# Patient Record
Sex: FEMALE | Race: WHITE | NOT HISPANIC OR LATINO | URBAN - METROPOLITAN AREA
[De-identification: names, ages, dates, MRNs, and addresses within clinical notes are randomized per-mention and may not be internally consistent; named-entity substitution may affect disease eponyms.]

---

## 2021-06-01 ENCOUNTER — OFFICE VISIT (OUTPATIENT)
Dept: URBAN - METROPOLITAN AREA TELEHEALTH 2 | Facility: TELEHEALTH | Age: 41
End: 2021-06-01
Payer: MEDICARE

## 2021-06-01 DIAGNOSIS — N80.9 ENDOMETRIOSIS: ICD-10-CM

## 2021-06-01 DIAGNOSIS — K50.80 CROHN'S DISEASE OF BOTH SMALL AND LARGE INTESTINE WITHOUT COMPLICATION: ICD-10-CM

## 2021-06-01 DIAGNOSIS — N80.8 OTHER ENDOMETRIOSIS: ICD-10-CM

## 2021-06-01 DIAGNOSIS — L98.8 FISTULA: ICD-10-CM

## 2021-06-01 PROBLEM — 71833008: Status: ACTIVE | Noted: 2021-06-01

## 2021-06-01 PROBLEM — 129103003: Status: ACTIVE | Noted: 2021-06-01

## 2021-06-01 PROCEDURE — 99214 OFFICE O/P EST MOD 30 MIN: CPT | Performed by: INTERNAL MEDICINE

## 2021-06-01 RX ORDER — ERGOCALCIFEROL 1.25 MG/1
CAPSULE ORAL
Qty: 0 | Refills: 0 | Status: DISCONTINUED | COMMUNITY
Start: 1900-01-01

## 2021-06-01 NOTE — HPI-TODAY'S VISIT:
40 y.o. 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 Barfield did it - really helped when was in it - stays irritated w/o drain Not having flare ups all the time Having them more frequent though Flare ups make her fistula worse Not even sure if needs Crohn's medicine D/c w/ steroids and 6MP, which was on in past, as well as bp meds - haven't started them yet Cimzia and Humira have done great - been several years

## 2021-06-14 ENCOUNTER — WEB ENCOUNTER (OUTPATIENT)
Dept: URBAN - METROPOLITAN AREA CLINIC 92 | Facility: CLINIC | Age: 41
End: 2021-06-14

## 2021-06-14 RX ORDER — PREDNISONE 10 MG/1
TAPER TABLET ORAL
Qty: 60 | Refills: 0 | OUTPATIENT

## 2021-07-04 ENCOUNTER — OUT OF OFFICE VISIT (OUTPATIENT)
Dept: URBAN - METROPOLITAN AREA MEDICAL CENTER 28 | Facility: MEDICAL CENTER | Age: 41
End: 2021-07-04
Payer: MEDICARE

## 2021-07-04 DIAGNOSIS — R74.01 ALT (SGPT) LEVEL RAISED: ICD-10-CM

## 2021-07-04 DIAGNOSIS — R74.8 ABNORMAL ALKALINE PHOSPHATASE TEST: ICD-10-CM

## 2021-07-04 DIAGNOSIS — D64.89 ANEMIA DUE TO OTHER CAUSE: ICD-10-CM

## 2021-07-04 DIAGNOSIS — K50.90 ABDOMINAL PAIN DESPITE THERAPY FOR CROHN'S DISEASE: ICD-10-CM

## 2021-07-04 PROCEDURE — 99232 SBSQ HOSP IP/OBS MODERATE 35: CPT | Performed by: INTERNAL MEDICINE

## 2021-07-04 PROCEDURE — 99222 1ST HOSP IP/OBS MODERATE 55: CPT | Performed by: INTERNAL MEDICINE

## 2021-07-04 PROCEDURE — G8427 DOCREV CUR MEDS BY ELIG CLIN: HCPCS | Performed by: INTERNAL MEDICINE

## 2021-07-06 ENCOUNTER — OUT OF OFFICE VISIT (OUTPATIENT)
Dept: URBAN - METROPOLITAN AREA MEDICAL CENTER 28 | Facility: MEDICAL CENTER | Age: 41
End: 2021-07-06
Payer: MEDICARE

## 2021-07-06 DIAGNOSIS — R19.7 ACUTE DIARRHEA: ICD-10-CM

## 2021-07-06 DIAGNOSIS — R74.01 ALT (SGPT) LEVEL RAISED: ICD-10-CM

## 2021-07-06 DIAGNOSIS — K50.918 ACUTE CROHN'S DISEASE WITH OTHER COMPLICATION: ICD-10-CM

## 2021-07-06 PROCEDURE — 99232 SBSQ HOSP IP/OBS MODERATE 35: CPT | Performed by: INTERNAL MEDICINE

## 2021-07-08 ENCOUNTER — OUT OF OFFICE VISIT (OUTPATIENT)
Dept: URBAN - METROPOLITAN AREA MEDICAL CENTER 28 | Facility: MEDICAL CENTER | Age: 41
End: 2021-07-08
Payer: MEDICARE

## 2021-07-08 DIAGNOSIS — R10.84 ABDOMINAL CRAMPING, GENERALIZED: ICD-10-CM

## 2021-07-08 DIAGNOSIS — K50.918 ACUTE CROHN'S DISEASE WITH OTHER COMPLICATION: ICD-10-CM

## 2021-07-08 DIAGNOSIS — R74.8 ABNORMAL ALKALINE PHOSPHATASE TEST: ICD-10-CM

## 2021-07-08 DIAGNOSIS — D72.829 ELEVATED WBC COUNT: ICD-10-CM

## 2021-07-08 PROCEDURE — 99232 SBSQ HOSP IP/OBS MODERATE 35: CPT | Performed by: INTERNAL MEDICINE

## 2021-07-12 ENCOUNTER — OFFICE VISIT (OUTPATIENT)
Dept: URBAN - METROPOLITAN AREA CLINIC 96 | Facility: CLINIC | Age: 41
End: 2021-07-12
Payer: MEDICARE

## 2021-07-12 ENCOUNTER — WEB ENCOUNTER (OUTPATIENT)
Dept: URBAN - METROPOLITAN AREA CLINIC 96 | Facility: CLINIC | Age: 41
End: 2021-07-12

## 2021-07-12 VITALS
DIASTOLIC BLOOD PRESSURE: 95 MMHG | BODY MASS INDEX: 30.32 KG/M2 | HEART RATE: 108 BPM | TEMPERATURE: 98.1 F | WEIGHT: 182 LBS | SYSTOLIC BLOOD PRESSURE: 146 MMHG | HEIGHT: 65 IN

## 2021-07-12 DIAGNOSIS — R74.8 ABNORMAL LIVER ENZYMES: ICD-10-CM

## 2021-07-12 DIAGNOSIS — R11.2 INTRACTABLE VOMITING WITH NAUSEA, UNSPECIFIED VOMITING TYPE: ICD-10-CM

## 2021-07-12 DIAGNOSIS — R10.9 ABDOMINAL CRAMPING: ICD-10-CM

## 2021-07-12 DIAGNOSIS — K50.813 CROHN'S DISEASE OF BOTH SMALL AND LARGE INTESTINE WITH FISTULA: ICD-10-CM

## 2021-07-12 PROCEDURE — 99214 OFFICE O/P EST MOD 30 MIN: CPT | Performed by: INTERNAL MEDICINE

## 2021-07-12 RX ORDER — PREDNISONE 10 MG/1
TAPER TABLET ORAL
Qty: 60 | Refills: 0 | Status: ACTIVE | COMMUNITY

## 2021-07-12 NOTE — HPI-TODAY'S VISIT:
41 y.o. female Rough couple months IV steroids help, but, once starts to taper steroids, flares again Been doing well for a long time off all medicine Steroids just make every other syx worse - hate the way make her feel In and out of hospital at home and here last week Deals with the diarrhea, but the cramping is unbearable leading to N/V When stool goes through fistula, it is a real issue

## 2021-07-19 ENCOUNTER — LAB OUTSIDE AN ENCOUNTER (OUTPATIENT)
Dept: URBAN - METROPOLITAN AREA CLINIC 96 | Facility: CLINIC | Age: 41
End: 2021-07-19

## 2021-07-26 ENCOUNTER — LAB OUTSIDE AN ENCOUNTER (OUTPATIENT)
Dept: URBAN - METROPOLITAN AREA CLINIC 96 | Facility: CLINIC | Age: 41
End: 2021-07-26

## 2021-08-02 ENCOUNTER — LAB OUTSIDE AN ENCOUNTER (OUTPATIENT)
Dept: URBAN - METROPOLITAN AREA CLINIC 96 | Facility: CLINIC | Age: 41
End: 2021-08-02

## 2021-08-06 ENCOUNTER — TELEPHONE ENCOUNTER (OUTPATIENT)
Dept: URBAN - METROPOLITAN AREA CLINIC 96 | Facility: CLINIC | Age: 41
End: 2021-08-06

## 2021-08-07 ENCOUNTER — OUT OF OFFICE VISIT (OUTPATIENT)
Dept: URBAN - METROPOLITAN AREA MEDICAL CENTER 28 | Facility: MEDICAL CENTER | Age: 41
End: 2021-08-07
Payer: MEDICARE

## 2021-08-07 DIAGNOSIS — R19.7 ACUTE DIARRHEA: ICD-10-CM

## 2021-08-07 DIAGNOSIS — R74.01 ABNORMAL/ELEVATED TRANSAMINASE (SGOT, AMINOTRANSFERASE): ICD-10-CM

## 2021-08-07 DIAGNOSIS — K50.012 CROHN'S DISEASE OF DUODENUM WITH INTESTINAL OBSTRUCTION: ICD-10-CM

## 2021-08-07 DIAGNOSIS — D64.89 ANEMIA DUE TO OTHER CAUSE: ICD-10-CM

## 2021-08-07 DIAGNOSIS — K50.018 CROHN'S DISEASE OF DUODENUM WITH OTHER COMPLICATION: ICD-10-CM

## 2021-08-07 PROCEDURE — 99233 SBSQ HOSP IP/OBS HIGH 50: CPT | Performed by: INTERNAL MEDICINE

## 2021-08-07 PROCEDURE — 99223 1ST HOSP IP/OBS HIGH 75: CPT | Performed by: INTERNAL MEDICINE

## 2021-08-07 PROCEDURE — G8427 DOCREV CUR MEDS BY ELIG CLIN: HCPCS | Performed by: INTERNAL MEDICINE

## 2021-08-09 ENCOUNTER — OUT OF OFFICE VISIT (OUTPATIENT)
Dept: URBAN - METROPOLITAN AREA MEDICAL CENTER 28 | Facility: MEDICAL CENTER | Age: 41
End: 2021-08-09
Payer: MEDICARE

## 2021-08-09 DIAGNOSIS — R74.01 ABNORMAL/ELEVATED TRANSAMINASE (SGOT, AMINOTRANSFERASE): ICD-10-CM

## 2021-08-09 DIAGNOSIS — K50.912 ACUTE CROHN'S DISEASE WITH INTESTINAL OBSTRUCTION: ICD-10-CM

## 2021-08-09 PROCEDURE — 99232 SBSQ HOSP IP/OBS MODERATE 35: CPT | Performed by: INTERNAL MEDICINE

## 2021-08-10 ENCOUNTER — OUT OF OFFICE VISIT (OUTPATIENT)
Dept: URBAN - METROPOLITAN AREA MEDICAL CENTER 28 | Facility: MEDICAL CENTER | Age: 41
End: 2021-08-10
Payer: MEDICARE

## 2021-08-10 DIAGNOSIS — K50.012 CROHN'S DISEASE OF DUODENUM WITH INTESTINAL OBSTRUCTION: ICD-10-CM

## 2021-08-10 PROCEDURE — 45386 COLONOSCOPY W/BALLOON DILAT: CPT | Performed by: INTERNAL MEDICINE

## 2021-08-11 ENCOUNTER — OUT OF OFFICE VISIT (OUTPATIENT)
Dept: URBAN - METROPOLITAN AREA MEDICAL CENTER 28 | Facility: MEDICAL CENTER | Age: 41
End: 2021-08-11
Payer: MEDICARE

## 2021-08-11 DIAGNOSIS — R74.01 ABNORMAL/ELEVATED TRANSAMINASE (SGOT, AMINOTRANSFERASE): ICD-10-CM

## 2021-08-11 DIAGNOSIS — K50.912 ACUTE CROHN'S DISEASE WITH INTESTINAL OBSTRUCTION: ICD-10-CM

## 2021-08-11 PROCEDURE — 99232 SBSQ HOSP IP/OBS MODERATE 35: CPT | Performed by: INTERNAL MEDICINE

## 2021-08-12 PROBLEM — 71833008: Status: ACTIVE | Noted: 2021-07-12

## 2021-08-19 ENCOUNTER — WEB ENCOUNTER (OUTPATIENT)
Dept: URBAN - METROPOLITAN AREA CLINIC 92 | Facility: CLINIC | Age: 41
End: 2021-08-19

## 2021-08-19 ENCOUNTER — OFFICE VISIT (OUTPATIENT)
Dept: URBAN - METROPOLITAN AREA CLINIC 97 | Facility: CLINIC | Age: 41
End: 2021-08-19
Payer: MEDICARE

## 2021-08-19 VITALS
RESPIRATION RATE: 18 BRPM | BODY MASS INDEX: 30.49 KG/M2 | WEIGHT: 183 LBS | HEART RATE: 93 BPM | DIASTOLIC BLOOD PRESSURE: 95 MMHG | SYSTOLIC BLOOD PRESSURE: 111 MMHG | TEMPERATURE: 97.2 F | HEIGHT: 65 IN

## 2021-08-19 DIAGNOSIS — K50.80 CROHN'S COLITIS: ICD-10-CM

## 2021-08-19 PROBLEM — 56689002: Status: ACTIVE | Noted: 2021-08-19

## 2021-08-19 PROCEDURE — 96375 TX/PRO/DX INJ NEW DRUG ADDON: CPT | Performed by: INTERNAL MEDICINE

## 2021-08-19 PROCEDURE — 96365 THER/PROPH/DIAG IV INF INIT: CPT | Performed by: INTERNAL MEDICINE

## 2021-08-19 RX ORDER — ONDANSETRON HYDROCHLORIDE 4 MG/1
1 TABLET, FILM COATED ORAL ONCE
Qty: 1 | Refills: 0 | Status: ACTIVE | COMMUNITY
Start: 2021-08-19

## 2021-08-19 RX ORDER — HYDROCORTISONE SODIUM SUCCINATE 250 MG/2ML
AS DIRECTED INJECTION, POWDER, FOR SOLUTION INTRAMUSCULAR; INTRAVENOUS
Qty: 1 | Refills: 0 | OUTPATIENT
Start: 2021-08-19 | End: 2021-08-20

## 2021-08-19 RX ORDER — HYDROCORTISONE SODIUM SUCCINATE 250 MG/2ML
AS DIRECTED INJECTION, POWDER, FOR SOLUTION INTRAMUSCULAR; INTRAVENOUS
Qty: 1 | Refills: 0 | Status: ACTIVE | COMMUNITY
Start: 2021-08-19 | End: 2021-08-20

## 2021-08-19 RX ORDER — ONDANSETRON HYDROCHLORIDE 4 MG/1
1 TABLET, FILM COATED ORAL ONCE
Qty: 1 | Refills: 0 | OUTPATIENT
Start: 2021-08-19

## 2021-08-19 RX ORDER — PREDNISONE 10 MG/1
TAPER TABLET ORAL
Qty: 60 | Refills: 0 | Status: ACTIVE | COMMUNITY

## 2021-09-06 ENCOUNTER — OUT OF OFFICE VISIT (OUTPATIENT)
Dept: URBAN - METROPOLITAN AREA MEDICAL CENTER 28 | Facility: MEDICAL CENTER | Age: 41
End: 2021-09-06
Payer: MEDICARE

## 2021-09-06 DIAGNOSIS — R10.84 ABDOMINAL CRAMPING, GENERALIZED: ICD-10-CM

## 2021-09-06 DIAGNOSIS — R11.2 ACUTE NAUSEA WITH NONBILIOUS VOMITING: ICD-10-CM

## 2021-09-06 DIAGNOSIS — K50.90 ABDOMINAL PAIN DESPITE THERAPY FOR CROHN'S DISEASE: ICD-10-CM

## 2021-09-06 DIAGNOSIS — R93.3 ABN FINDINGS-GI TRACT: ICD-10-CM

## 2021-09-06 PROCEDURE — 99222 1ST HOSP IP/OBS MODERATE 55: CPT | Performed by: INTERNAL MEDICINE

## 2021-09-06 PROCEDURE — G8427 DOCREV CUR MEDS BY ELIG CLIN: HCPCS | Performed by: INTERNAL MEDICINE

## 2021-09-07 ENCOUNTER — OUT OF OFFICE VISIT (OUTPATIENT)
Dept: URBAN - METROPOLITAN AREA MEDICAL CENTER 28 | Facility: MEDICAL CENTER | Age: 41
End: 2021-09-07
Payer: MEDICARE

## 2021-09-07 DIAGNOSIS — R93.3 ABN FINDINGS-GI TRACT: ICD-10-CM

## 2021-09-07 DIAGNOSIS — R11.2 ACUTE NAUSEA WITH NONBILIOUS VOMITING: ICD-10-CM

## 2021-09-07 DIAGNOSIS — K50.90 ABDOMINAL PAIN DESPITE THERAPY FOR CROHN'S DISEASE: ICD-10-CM

## 2021-09-07 DIAGNOSIS — R10.84 ABDOMINAL CRAMPING, GENERALIZED: ICD-10-CM

## 2021-09-07 DIAGNOSIS — K50.913 ACUTE CROHN'S DISEASE WITH FISTULA: ICD-10-CM

## 2021-09-07 PROCEDURE — 99232 SBSQ HOSP IP/OBS MODERATE 35: CPT | Performed by: PHYSICIAN ASSISTANT

## 2021-09-07 PROCEDURE — 99232 SBSQ HOSP IP/OBS MODERATE 35: CPT | Performed by: INTERNAL MEDICINE

## 2021-09-09 ENCOUNTER — OUT OF OFFICE VISIT (OUTPATIENT)
Dept: URBAN - METROPOLITAN AREA MEDICAL CENTER 28 | Facility: MEDICAL CENTER | Age: 41
End: 2021-09-09
Payer: MEDICARE

## 2021-09-09 DIAGNOSIS — D72.829 ELEVATED WBC COUNT: ICD-10-CM

## 2021-09-09 DIAGNOSIS — D64.89 ANEMIA DUE TO OTHER CAUSE: ICD-10-CM

## 2021-09-09 DIAGNOSIS — K50.912 ACUTE CROHN'S DISEASE WITH INTESTINAL OBSTRUCTION: ICD-10-CM

## 2021-09-09 PROCEDURE — 99232 SBSQ HOSP IP/OBS MODERATE 35: CPT | Performed by: INTERNAL MEDICINE

## 2021-09-11 ENCOUNTER — OUT OF OFFICE VISIT (OUTPATIENT)
Dept: URBAN - METROPOLITAN AREA MEDICAL CENTER 28 | Facility: MEDICAL CENTER | Age: 41
End: 2021-09-11
Payer: MEDICARE

## 2021-09-11 DIAGNOSIS — E87.6 HYPOKALEMIA: ICD-10-CM

## 2021-09-11 DIAGNOSIS — D64.89 ANEMIA DUE TO OTHER CAUSE: ICD-10-CM

## 2021-09-11 DIAGNOSIS — R93.3 ABN FINDINGS-GI TRACT: ICD-10-CM

## 2021-09-11 DIAGNOSIS — K50.90 ABDOMINAL PAIN DESPITE THERAPY FOR CROHN'S DISEASE: ICD-10-CM

## 2021-09-11 PROCEDURE — 99232 SBSQ HOSP IP/OBS MODERATE 35: CPT | Performed by: INTERNAL MEDICINE

## 2021-09-13 ENCOUNTER — OUT OF OFFICE VISIT (OUTPATIENT)
Dept: URBAN - METROPOLITAN AREA MEDICAL CENTER 28 | Facility: MEDICAL CENTER | Age: 41
End: 2021-09-13
Payer: MEDICARE

## 2021-09-13 DIAGNOSIS — D72.829 ELEVATED WBC COUNT: ICD-10-CM

## 2021-09-13 DIAGNOSIS — K50.912 ACUTE CROHN'S DISEASE WITH INTESTINAL OBSTRUCTION: ICD-10-CM

## 2021-09-13 DIAGNOSIS — K50.913 ACUTE CROHN'S DISEASE WITH FISTULA: ICD-10-CM

## 2021-09-13 PROCEDURE — 99232 SBSQ HOSP IP/OBS MODERATE 35: CPT | Performed by: INTERNAL MEDICINE

## 2021-09-16 ENCOUNTER — OUT OF OFFICE VISIT (OUTPATIENT)
Dept: URBAN - METROPOLITAN AREA MEDICAL CENTER 28 | Facility: MEDICAL CENTER | Age: 41
End: 2021-09-16
Payer: MEDICARE

## 2021-09-16 DIAGNOSIS — K50.912 ACUTE CROHN'S DISEASE WITH INTESTINAL OBSTRUCTION: ICD-10-CM

## 2021-09-16 DIAGNOSIS — K50.913 ACUTE CROHN'S DISEASE WITH FISTULA: ICD-10-CM

## 2021-09-16 PROCEDURE — 99232 SBSQ HOSP IP/OBS MODERATE 35: CPT | Performed by: INTERNAL MEDICINE

## 2021-09-18 ENCOUNTER — OUT OF OFFICE VISIT (OUTPATIENT)
Dept: URBAN - METROPOLITAN AREA MEDICAL CENTER 28 | Facility: MEDICAL CENTER | Age: 41
End: 2021-09-18
Payer: MEDICARE

## 2021-09-18 DIAGNOSIS — K50.912 ACUTE CROHN'S DISEASE WITH INTESTINAL OBSTRUCTION: ICD-10-CM

## 2021-09-18 DIAGNOSIS — K50.913 ACUTE CROHN'S DISEASE WITH FISTULA: ICD-10-CM

## 2021-09-18 PROCEDURE — 99233 SBSQ HOSP IP/OBS HIGH 50: CPT | Performed by: INTERNAL MEDICINE

## 2021-09-22 ENCOUNTER — OUT OF OFFICE VISIT (OUTPATIENT)
Dept: URBAN - METROPOLITAN AREA MEDICAL CENTER 28 | Facility: MEDICAL CENTER | Age: 41
End: 2021-09-22
Payer: MEDICARE

## 2021-09-22 DIAGNOSIS — K50.912 ACUTE CROHN'S DISEASE WITH INTESTINAL OBSTRUCTION: ICD-10-CM

## 2021-09-22 DIAGNOSIS — K50.913 ACUTE CROHN'S DISEASE WITH FISTULA: ICD-10-CM

## 2021-09-22 PROCEDURE — 99232 SBSQ HOSP IP/OBS MODERATE 35: CPT | Performed by: INTERNAL MEDICINE

## 2021-10-04 ENCOUNTER — OFFICE VISIT (OUTPATIENT)
Dept: URBAN - METROPOLITAN AREA TELEHEALTH 2 | Facility: TELEHEALTH | Age: 41
End: 2021-10-04
Payer: MEDICARE

## 2021-10-04 ENCOUNTER — LAB OUTSIDE AN ENCOUNTER (OUTPATIENT)
Dept: URBAN - METROPOLITAN AREA CLINIC 96 | Facility: CLINIC | Age: 41
End: 2021-10-04

## 2021-10-04 ENCOUNTER — WEB ENCOUNTER (OUTPATIENT)
Dept: URBAN - METROPOLITAN AREA CLINIC 92 | Facility: CLINIC | Age: 41
End: 2021-10-04

## 2021-10-04 VITALS
BODY MASS INDEX: 29.32 KG/M2 | SYSTOLIC BLOOD PRESSURE: 96 MMHG | DIASTOLIC BLOOD PRESSURE: 67 MMHG | HEIGHT: 65 IN | WEIGHT: 176 LBS | TEMPERATURE: 98.2 F | HEART RATE: 106 BPM

## 2021-10-04 DIAGNOSIS — K50.812 CROHN'S DISEASE OF BOTH SMALL AND LARGE INTESTINE WITH INTESTINAL OBSTRUCTION: ICD-10-CM

## 2021-10-04 LAB
ABSOLUTE BASOPHIL COUNT: 0.04
ABSOLUTE EOSINOPHIL COUNT: 0.2
ABSOLUTE IMMATURE GRANULOCYTE  COUNT: 0.05
ABSOLUTE LYMPHOCYTE COUNT: 2.24
ABSOLUTE MONOCYTE COUNT: 0.96
ABSOLUTE NEUTROPHIL COUNT (ANC): 9.73
ABSOLUTE NRBC  COUNT: 0
AG RATIO: 1
ALBUMIN LEVEL: 3.4
ALK PHOS: 173
ALT: 28
ANION GAP: 11
AST: 19
BASOPHIL AUTO: 0
BILIRUBIN TOTAL: 0.3
BUN/CREAT RATIO: 14
BUN: 12
CALCIUM LEVEL: 9.2
CHLORIDE LEVEL: 109
CO2 LEVEL: 20
CREATININE LEVEL: 0.8
EOS AUTO: 2
FERRITIN LEVEL: 172.3
GFR AFRICAN AMERICAN: >60
GFR NON AFRICAN AMERICAN: >60
GLUCOSE LEVEL: 82
HCT: 31.4
HGB: 9.6
IMMATURE GRANULOCYTES AUTO: 0.4
IRON LEVEL: 19
IRON SAT: 7
LYMPH AUTO: 17
MAGNESIUM LEVEL: 1.8
MCH: 26.8
MCHC: 30.6
MCV: 87.7
MONO AUTO: 7
MPV: 10.7
NEUTRO AUTO: 74
NRBC AUTO: 0
OSMO (CALC): 278
PERFORMING LAB: (no result)
PLATELETS: 470
POTASSIUM LEVEL: 3.1
PROTEIN TOTAL: 6.8
RBC: 3.58
RDW: 14.2
SODIUM LEVEL: 140
TIBC: 262
WBC: 13.2

## 2021-10-04 PROCEDURE — 99214 OFFICE O/P EST MOD 30 MIN: CPT | Performed by: INTERNAL MEDICINE

## 2021-10-04 RX ORDER — POTASSIUM CHLORIDE 1.5 G/1.58G
1 PACKET WITH FOOD POWDER, FOR SOLUTION ORAL ONCE A DAY
Qty: 5 | Refills: 0 | OUTPATIENT

## 2021-10-04 RX ORDER — CHOLESTYRAMINE LIGHT 4 G/5.7G
1 PACKET POWDER, FOR SUSPENSION ORAL
Qty: 30 | Refills: 5 | OUTPATIENT

## 2021-10-04 RX ORDER — USTEKINUMAB 90 MG/ML
AS DIRECTED INJECTION, SOLUTION SUBCUTANEOUS
Qty: 1 PRE-FILLED PEN SYRINGE | Refills: 2 | OUTPATIENT

## 2021-10-04 NOTE — PHYSICAL EXAM GASTROINTESTINAL
Abdomen , soft, nontender, nondistended , no guarding or rigidity , no masses palpable , normal bowel sounds , Liver and Spleen , no hepatomegaly present , no hepatosplenomegaly , liver nontender , spleen not palpable  large vertical incision healing well

## 2021-10-04 NOTE — HPI-TODAY'S VISIT:
41 y.o WF 2 weeks ago had bowel resection - has appt w/ [facility] Jeffrtbelkis later today Feeling better than been Still weak - regular surgery recovery stuff Going to bathroom good Eating ok Stelara infusion 8/19/21 No blood in stool No joint pains

## 2021-10-15 ENCOUNTER — OFFICE VISIT (OUTPATIENT)
Dept: URBAN - METROPOLITAN AREA TELEHEALTH 2 | Facility: TELEHEALTH | Age: 41
End: 2021-10-15

## 2021-11-05 ENCOUNTER — TELEPHONE ENCOUNTER (OUTPATIENT)
Dept: URBAN - METROPOLITAN AREA CLINIC 92 | Facility: CLINIC | Age: 41
End: 2021-11-05

## 2021-11-12 ENCOUNTER — TELEPHONE ENCOUNTER (OUTPATIENT)
Dept: URBAN - METROPOLITAN AREA CLINIC 92 | Facility: CLINIC | Age: 41
End: 2021-11-12

## 2021-11-23 ENCOUNTER — TELEPHONE ENCOUNTER (OUTPATIENT)
Dept: URBAN - METROPOLITAN AREA CLINIC 96 | Facility: CLINIC | Age: 41
End: 2021-11-23

## 2021-11-24 ENCOUNTER — OFFICE VISIT (OUTPATIENT)
Dept: URBAN - METROPOLITAN AREA TELEHEALTH 2 | Facility: TELEHEALTH | Age: 41
End: 2021-11-24
Payer: MEDICARE

## 2021-11-24 ENCOUNTER — TELEPHONE ENCOUNTER (OUTPATIENT)
Dept: URBAN - METROPOLITAN AREA CLINIC 92 | Facility: CLINIC | Age: 41
End: 2021-11-24

## 2021-11-24 DIAGNOSIS — Z79.899 LONG-TERM USE OF IMMUNOSUPPRESSANT MEDICATION: ICD-10-CM

## 2021-11-24 DIAGNOSIS — K50.812 CROHN'S DISEASE OF BOTH SMALL AND LARGE INTESTINE WITH INTESTINAL OBSTRUCTION: ICD-10-CM

## 2021-11-24 DIAGNOSIS — R10.84 ABDOMINAL PAIN, GENERALIZED: ICD-10-CM

## 2021-11-24 PROCEDURE — 99215 OFFICE O/P EST HI 40 MIN: CPT | Performed by: INTERNAL MEDICINE

## 2021-11-24 RX ORDER — CHOLESTYRAMINE LIGHT 4 G/5.7G
1 PACKET POWDER, FOR SUSPENSION ORAL
Qty: 30 | Refills: 5 | Status: ACTIVE | COMMUNITY

## 2021-11-24 RX ORDER — USTEKINUMAB 90 MG/ML
AS DIRECTED INJECTION, SOLUTION SUBCUTANEOUS
Qty: 1 | Refills: 11 | OUTPATIENT
Start: 2021-11-24 | End: 2023-09-27

## 2021-11-24 RX ORDER — POTASSIUM CHLORIDE 1.5 G/1.58G
1 PACKET WITH FOOD POWDER, FOR SOLUTION ORAL ONCE A DAY
Qty: 5 | Refills: 0 | Status: ACTIVE | COMMUNITY

## 2021-11-24 RX ORDER — METHOTREXATE SODIUM 2.5 MG/1
5 TAB TABLET ORAL WEEKLY
Qty: 20 | Refills: 4 | OUTPATIENT
Start: 2021-11-24

## 2021-11-24 RX ORDER — USTEKINUMAB 90 MG/ML
AS DIRECTED INJECTION, SOLUTION SUBCUTANEOUS
Qty: 1 PRE-FILLED PEN SYRINGE | Refills: 2 | Status: ACTIVE | COMMUNITY

## 2021-11-24 RX ORDER — FOLIC ACID 1 MG/1
1 TABLET TABLET ORAL ONCE A DAY
Qty: 30 | Refills: 11 | OUTPATIENT
Start: 2021-11-24 | End: 2022-11-19

## 2021-11-24 NOTE — HPI-TODAY'S VISIT:
This is a 41-year-old individual with a history of Crohns disease, currently on stelara (started infusion 8/2021), here for follow up. . Pt is referred to us by Dr. Torres Vernon. . Patient was diagnosed with Crohns disease numerous years ago. She was last admitted in August 2021 with severe abdominal pain nausea and vomiting.  CT scan on August 2021 did revealed upstream dilation with low grade partial obstruction.  A balloon dilatation was performed at the time on August 10, 2021. The procedure was successful and biopsies taken revealed fibrosis. There is no active inflammation at the surgical anastomosis. However, she had complaints of further abdominal pain and underwent surgical resection in 9/2021. . Today on 11/24/2021, pt reports that she was doing very well until she developed N/V abd pain about 2 weeks ago. The pain was not severe as prior.  She had a CT scan performed at OSH, which showed partial obstruction.  She did not get c diff testing, but was given cipro/flagyl.  Has not had Stelara since .

## 2021-12-01 ENCOUNTER — LAB OUTSIDE AN ENCOUNTER (OUTPATIENT)
Dept: URBAN - METROPOLITAN AREA TELEHEALTH 2 | Facility: TELEHEALTH | Age: 41
End: 2021-12-01

## 2021-12-15 ENCOUNTER — TELEPHONE ENCOUNTER (OUTPATIENT)
Dept: URBAN - METROPOLITAN AREA CLINIC 92 | Facility: CLINIC | Age: 41
End: 2021-12-15

## 2021-12-16 ENCOUNTER — TELEPHONE ENCOUNTER (OUTPATIENT)
Dept: URBAN - METROPOLITAN AREA CLINIC 92 | Facility: CLINIC | Age: 41
End: 2021-12-16

## 2021-12-16 ENCOUNTER — OFFICE VISIT (OUTPATIENT)
Dept: URBAN - METROPOLITAN AREA TELEHEALTH 2 | Facility: TELEHEALTH | Age: 41
End: 2021-12-16
Payer: MEDICARE

## 2021-12-16 DIAGNOSIS — R10.9 ABDOMINAL CRAMPING: ICD-10-CM

## 2021-12-16 DIAGNOSIS — K50.812 CROHN'S DISEASE OF BOTH SMALL AND LARGE INTESTINE WITH INTESTINAL OBSTRUCTION: ICD-10-CM

## 2021-12-16 DIAGNOSIS — Z09 FOLLOW UP: ICD-10-CM

## 2021-12-16 DIAGNOSIS — Z79.899 LONG-TERM USE OF IMMUNOSUPPRESSANT MEDICATION: ICD-10-CM

## 2021-12-16 PROCEDURE — 99215 OFFICE O/P EST HI 40 MIN: CPT | Performed by: INTERNAL MEDICINE

## 2021-12-16 RX ORDER — USTEKINUMAB 90 MG/ML
AS DIRECTED INJECTION, SOLUTION SUBCUTANEOUS
Qty: 1 PRE-FILLED PEN SYRINGE | Refills: 2 | Status: ACTIVE | COMMUNITY

## 2021-12-16 RX ORDER — FOLIC ACID 1 MG/1
1 TABLET TABLET ORAL ONCE A DAY
Qty: 30 | Refills: 11 | Status: ACTIVE | COMMUNITY
Start: 2021-11-24 | End: 2022-11-19

## 2021-12-16 RX ORDER — FOLIC ACID 1 MG/1
1 TABLET TABLET ORAL ONCE A DAY
Qty: 30 | Refills: 11 | OUTPATIENT

## 2021-12-16 RX ORDER — USTEKINUMAB 90 MG/ML
AS DIRECTED INJECTION, SOLUTION SUBCUTANEOUS
Qty: 1 | Refills: 11 | Status: ACTIVE | COMMUNITY
Start: 2021-11-24 | End: 2023-09-27

## 2021-12-16 RX ORDER — PREDNISONE 10 MG/1
3 TABLET TABLET ORAL ONCE A DAY
Qty: 90 TABLET | Refills: 1 | OUTPATIENT
Start: 2021-12-16 | End: 2022-02-14

## 2021-12-16 RX ORDER — USTEKINUMAB 90 MG/ML
AS DIRECTED INJECTION, SOLUTION SUBCUTANEOUS
Qty: 1 | Refills: 11 | OUTPATIENT

## 2021-12-16 RX ORDER — CHOLESTYRAMINE LIGHT 4 G/5.7G
1 PACKET POWDER, FOR SUSPENSION ORAL
Qty: 30 | Refills: 5 | Status: ACTIVE | COMMUNITY

## 2021-12-16 RX ORDER — METHOTREXATE SODIUM 2.5 MG/1
5 TAB TABLET ORAL WEEKLY
Qty: 20 | Refills: 4 | Status: ACTIVE | COMMUNITY
Start: 2021-11-24

## 2021-12-16 RX ORDER — POTASSIUM CHLORIDE 1.5 G/1.58G
1 PACKET WITH FOOD POWDER, FOR SOLUTION ORAL ONCE A DAY
Qty: 5 | Refills: 0 | Status: ACTIVE | COMMUNITY

## 2021-12-16 RX ORDER — METHOTREXATE SODIUM 2.5 MG/1
5 TAB TABLET ORAL WEEKLY
Qty: 20 | Refills: 4 | OUTPATIENT

## 2021-12-16 NOTE — HPI-TODAY'S VISIT:
This is a 41-year-old individual with a history of Crohns disease, currently on stelara (started infusion 8/2021) and MTX (12.5mg weekly started 11/2021), here for follow up. . Pt is referred to us by Dr. Torres Vernon. . Patient was diagnosed with Crohns disease numerous years ago. She was last admitted in August 2021 with severe abdominal pain nausea and vomiting.  CT scan on August 2021 did revealed upstream dilation with low grade partial obstruction.  A balloon dilatation was performed at the time on August 10, 2021. The procedure was successful and biopsies taken revealed fibrosis. There is no active inflammation at the surgical anastomosis. However, she had complaints of further abdominal pain and underwent surgical resection in 9/2021. . Previously on 11/24/2021, pt reports that she was doing very well until she developed N/V abd pain about 2 weeks ago. The pain was not severe as prior.  She had a CT scan performed at OSH, which showed partial obstruction.  She did not get c diff testing, but was given cipro/flagyl.  Has not had Stelara since. . Today on 12/16/2021, pt reports that lot of sxs started to flare when she tapered her prednisone down to 10mg.  She increased the dose back to 20mg, which helped somewhat.  She is very careful about what she eats. .

## 2022-01-27 ENCOUNTER — TELEPHONE ENCOUNTER (OUTPATIENT)
Dept: URBAN - METROPOLITAN AREA CLINIC 92 | Facility: CLINIC | Age: 42
End: 2022-01-27

## 2022-01-27 RX ORDER — PREDNISONE 10 MG/1
2 TABLETS TABLET ORAL ONCE A DAY
Qty: 60 | Refills: 0 | OUTPATIENT
Start: 2022-01-28 | End: 2022-02-27

## 2022-01-27 RX ORDER — CIPROFLOXACIN HYDROCHLORIDE 500 MG/1
1 TABLET TABLET, FILM COATED ORAL
Qty: 28 TABLET | Refills: 0 | OUTPATIENT
Start: 2022-01-28 | End: 2022-02-11

## 2022-01-27 RX ORDER — METRONIDAZOLE 500 MG/1
1 TABLET TABLET, FILM COATED ORAL
Qty: 28 TABLET | Refills: 0 | OUTPATIENT
Start: 2022-01-29 | End: 2022-02-12

## 2022-01-29 ENCOUNTER — TELEPHONE ENCOUNTER (OUTPATIENT)
Dept: URBAN - METROPOLITAN AREA CLINIC 92 | Facility: CLINIC | Age: 42
End: 2022-01-29

## 2022-01-29 RX ORDER — PREDNISONE 10 MG/1
1 TABLET TABLET ORAL ONCE A DAY
Qty: 30 | Refills: 0 | OUTPATIENT
Start: 2022-01-29 | End: 2022-02-28

## 2022-01-29 RX ORDER — METRONIDAZOLE 500 MG/1
1 TABLET TABLET ORAL TWICE A DAY
Qty: 28 TABLET | Refills: 0 | OUTPATIENT
Start: 2022-01-29 | End: 2022-02-12

## 2022-01-29 RX ORDER — CIPROFLOXACIN HYDROCHLORIDE 500 MG/1
1 TABLET TABLET, FILM COATED ORAL
Qty: 28 TABLET | Refills: 0 | OUTPATIENT
Start: 2022-01-29 | End: 2022-02-12

## 2022-01-30 ENCOUNTER — TELEPHONE ENCOUNTER (OUTPATIENT)
Dept: URBAN - METROPOLITAN AREA CLINIC 92 | Facility: CLINIC | Age: 42
End: 2022-01-30

## 2022-01-30 RX ORDER — METRONIDAZOLE 500 MG/1
1 TABLET TABLET, FILM COATED ORAL
Qty: 28 TABLET | Refills: 0 | OUTPATIENT
Start: 2022-01-30 | End: 2022-02-13

## 2022-01-30 RX ORDER — PREDNISONE 10 MG/1
2 TABLETS TABLET ORAL ONCE A DAY
Qty: 60 | Refills: 0 | OUTPATIENT
Start: 2022-01-30 | End: 2022-03-01

## 2022-01-30 RX ORDER — CIPROFLOXACIN HYDROCHLORIDE 500 MG/1
1 TABLET TABLET, FILM COATED ORAL
Qty: 28 TABLET | Refills: 0
Start: 2022-01-28 | End: 2022-02-13

## 2022-01-31 ENCOUNTER — TELEPHONE ENCOUNTER (OUTPATIENT)
Dept: URBAN - METROPOLITAN AREA CLINIC 92 | Facility: CLINIC | Age: 42
End: 2022-01-31

## 2022-01-31 RX ORDER — CIPROFLOXACIN HYDROCHLORIDE 500 MG/1
1 TABLET TABLET, FILM COATED ORAL
Qty: 28 TABLET | Refills: 0
Start: 2022-01-28 | End: 2022-02-14

## 2022-01-31 RX ORDER — PREDNISONE 10 MG/1
3 TABLET TABLET ORAL ONCE A DAY
Qty: 90 TABLET | Refills: 1
Start: 2021-12-16 | End: 2022-04-01

## 2022-01-31 RX ORDER — METRONIDAZOLE 500 MG/1
1 TABLET TABLET, FILM COATED ORAL
Qty: 28 TABLET | Refills: 0
Start: 2022-01-29 | End: 2022-02-14

## 2022-02-04 ENCOUNTER — TELEPHONE ENCOUNTER (OUTPATIENT)
Dept: URBAN - METROPOLITAN AREA CLINIC 92 | Facility: CLINIC | Age: 42
End: 2022-02-04

## 2022-02-10 ENCOUNTER — TELEPHONE ENCOUNTER (OUTPATIENT)
Dept: URBAN - METROPOLITAN AREA CLINIC 92 | Facility: CLINIC | Age: 42
End: 2022-02-10

## 2022-02-10 ENCOUNTER — LAB OUTSIDE AN ENCOUNTER (OUTPATIENT)
Dept: URBAN - METROPOLITAN AREA TELEHEALTH 2 | Facility: TELEHEALTH | Age: 42
End: 2022-02-10

## 2022-02-10 ENCOUNTER — OFFICE VISIT (OUTPATIENT)
Dept: URBAN - METROPOLITAN AREA TELEHEALTH 2 | Facility: TELEHEALTH | Age: 42
End: 2022-02-10
Payer: MEDICARE

## 2022-02-10 DIAGNOSIS — Z79.899 LONG-TERM USE OF IMMUNOSUPPRESSANT MEDICATION: ICD-10-CM

## 2022-02-10 DIAGNOSIS — R11.2 NAUSEA & VOMITING: ICD-10-CM

## 2022-02-10 DIAGNOSIS — K50.812 CROHN'S DISEASE OF BOTH SMALL AND LARGE INTESTINE WITH INTESTINAL OBSTRUCTION: ICD-10-CM

## 2022-02-10 DIAGNOSIS — R10.84 ABDOMINAL CRAMPING, GENERALIZED: ICD-10-CM

## 2022-02-10 PROCEDURE — 99215 OFFICE O/P EST HI 40 MIN: CPT | Performed by: INTERNAL MEDICINE

## 2022-02-10 RX ORDER — ONDANSETRON HYDROCHLORIDE 4 MG/1
1 TABLET TABLET, FILM COATED ORAL
Qty: 60 | Refills: 1 | OUTPATIENT
Start: 2022-02-10 | End: 2022-04-11

## 2022-02-10 RX ORDER — CHOLESTYRAMINE LIGHT 4 G/5.7G
1 PACKET POWDER, FOR SUSPENSION ORAL
Qty: 30 | Refills: 5 | Status: ACTIVE | COMMUNITY

## 2022-02-10 RX ORDER — FOLIC ACID 1 MG/1
1 TABLET TABLET ORAL ONCE A DAY
Qty: 30 | Refills: 11 | Status: ACTIVE | COMMUNITY

## 2022-02-10 RX ORDER — PREDNISONE 10 MG/1
3 TABLET TABLET ORAL ONCE A DAY
Qty: 90 TABLET | Refills: 1 | OUTPATIENT

## 2022-02-10 RX ORDER — POTASSIUM CHLORIDE 1.5 G/1.58G
1 PACKET WITH FOOD POWDER, FOR SOLUTION ORAL ONCE A DAY
Qty: 5 | Refills: 0 | Status: ACTIVE | COMMUNITY

## 2022-02-10 RX ORDER — POLYETHYLENE GLYCOL 3350, SODIUM SULFATE, SODIUM CHLORIDE, POTASSIUM CHLORIDE, ASCORBIC ACID, SODIUM ASCORBATE 140-9-5.2G
1 KIT KIT ORAL ONCE
Qty: 1 | Refills: 1 | OUTPATIENT
Start: 2022-02-10 | End: 2022-02-12

## 2022-02-10 RX ORDER — METHOTREXATE SODIUM 2.5 MG/1
10 TAB TABLET ORAL WEEKLY
Qty: 40 | Refills: 4 | OUTPATIENT

## 2022-02-10 RX ORDER — METRONIDAZOLE 500 MG/1
1 TABLET TABLET, FILM COATED ORAL
Qty: 28 TABLET | Refills: 0 | Status: ACTIVE | COMMUNITY
Start: 2022-01-30 | End: 2022-02-13

## 2022-02-10 RX ORDER — CIPROFLOXACIN HYDROCHLORIDE 500 MG/1
1 TABLET TABLET, FILM COATED ORAL
Qty: 28 TABLET | Refills: 0 | OUTPATIENT
Start: 2022-02-10 | End: 2022-02-24

## 2022-02-10 RX ORDER — FOLIC ACID 1 MG/1
1 TABLET TABLET ORAL ONCE A DAY
Qty: 30 | Refills: 11 | OUTPATIENT

## 2022-02-10 RX ORDER — USTEKINUMAB 90 MG/ML
AS DIRECTED INJECTION, SOLUTION SUBCUTANEOUS
Qty: 1 PRE-FILLED PEN SYRINGE | Refills: 2 | Status: ACTIVE | COMMUNITY

## 2022-02-10 RX ORDER — USTEKINUMAB 90 MG/ML
AS DIRECTED INJECTION, SOLUTION SUBCUTANEOUS
Qty: 1 | Refills: 11 | Status: ACTIVE | COMMUNITY

## 2022-02-10 RX ORDER — METRONIDAZOLE 500 MG/1
1 TABLET TABLET, FILM COATED ORAL
Qty: 28 TABLET | Refills: 0 | OUTPATIENT
Start: 2022-02-10 | End: 2022-02-24

## 2022-02-10 RX ORDER — METHOTREXATE SODIUM 2.5 MG/1
5 TAB TABLET ORAL WEEKLY
Qty: 20 | Refills: 4 | Status: ACTIVE | COMMUNITY

## 2022-02-10 RX ORDER — PREDNISONE 10 MG/1
2 TABLETS TABLET ORAL ONCE A DAY
Qty: 60 | Refills: 0 | Status: ACTIVE | COMMUNITY
Start: 2022-01-28 | End: 2022-02-27

## 2022-02-10 NOTE — HPI-TODAY'S VISIT:
This is a 41-year-old individual with a history of Crohns disease, currently on stelara (started infusion 8/2021) and MTX (12.5mg weekly started 11/2021), here for follow up. . Pt is referred to us by Dr. Torres Vernon. . Patient was diagnosed with Crohns disease numerous years ago. She was last admitted in August 2021 with severe abdominal pain nausea and vomiting.  CT scan on August 2021 did revealed upstream dilation with low grade partial obstruction.  A balloon dilatation was performed at the time on August 10, 2021. The procedure was successful and biopsies taken revealed fibrosis. There is no active inflammation at the surgical anastomosis. However, she had complaints of further abdominal pain and underwent surgical resection in 9/2021. . Previously on 11/24/2021, pt reports that she was doing very well until she developed N/V abd pain about 2 weeks ago. The pain was not severe as prior.  She had a CT scan performed at OSH, which showed partial obstruction.  She did not get c diff testing, but was given cipro/flagyl.  Has not had Stelara since. . Previously on 12/16/2021, pt reports that lot of sxs started to flare when she tapered her prednisone down to 10mg.  She increased the dose back to 20mg, which helped somewhat.  She is very careful about what she eats. . Today on 2/10/2022, pt reports that she was having lot abd pain and obstruction sxs.  She improved with a course of steroids and ABX.  She has been eating more of a soft diet.  She is on prednisone 30mg.  However, whenever she attempts to reduce the dose, she flares. .

## 2022-02-16 LAB
A/G RATIO: 1.6
ALBUMIN: 4.3
ALKALINE PHOSPHATASE: 145
ALT (SGPT): 105
AST (SGOT): 19
BASO (ABSOLUTE): 0.1
BASOS: 0
BILIRUBIN, TOTAL: <0.2
BUN/CREATININE RATIO: 20
BUN: 17
CALCIUM: 9.4
CARBON DIOXIDE, TOTAL: 19
CHLORIDE: 107
CREATININE: 0.83
EGFR IF AFRICN AM: 102
EGFR IF NONAFRICN AM: 88
EOS (ABSOLUTE): 0.1
EOS: 1
GLOBULIN, TOTAL: 2.7
GLUCOSE: 114
HEMATOCRIT: 37.8
HEMATOLOGY COMMENTS:: (no result)
HEMOGLOBIN: 11.8
IMMATURE CELLS: (no result)
IMMATURE GRANS (ABS): 0.1
IMMATURE GRANULOCYTES: 1
LYMPHS (ABSOLUTE): 1.3
LYMPHS: 11
MCH: 28.2
MCHC: 31.2
MCV: 90
MONOCYTES(ABSOLUTE): 0.4
MONOCYTES: 3
NEUTROPHILS (ABSOLUTE): 10.4
NEUTROPHILS: 84
NRBC: (no result)
PLATELETS: 230
POTASSIUM: 4.3
PROTEIN, TOTAL: 7
RBC: 4.19
RDW: 16.4
SODIUM: 143
WBC: 12.3

## 2022-03-03 ENCOUNTER — TELEPHONE ENCOUNTER (OUTPATIENT)
Dept: URBAN - METROPOLITAN AREA CLINIC 96 | Facility: CLINIC | Age: 42
End: 2022-03-03

## 2022-05-02 ENCOUNTER — OFFICE VISIT (OUTPATIENT)
Dept: URBAN - METROPOLITAN AREA MEDICAL CENTER 28 | Facility: MEDICAL CENTER | Age: 42
End: 2022-05-02

## 2022-06-14 ENCOUNTER — OFFICE VISIT (OUTPATIENT)
Dept: URBAN - METROPOLITAN AREA MEDICAL CENTER 28 | Facility: MEDICAL CENTER | Age: 42
End: 2022-06-14
Payer: MEDICARE

## 2022-06-14 DIAGNOSIS — K50.012 CROHN'S DISEASE OF DUODENUM WITH INTESTINAL OBSTRUCTION: ICD-10-CM

## 2022-06-14 PROCEDURE — 45386 COLONOSCOPY W/BALLOON DILAT: CPT | Performed by: INTERNAL MEDICINE

## 2022-06-14 RX ORDER — METHOTREXATE SODIUM 2.5 MG/1
10 TAB TABLET ORAL WEEKLY
Qty: 40 | Refills: 4 | Status: ACTIVE | COMMUNITY

## 2022-06-14 RX ORDER — POTASSIUM CHLORIDE 1.5 G/1.58G
1 PACKET WITH FOOD POWDER, FOR SOLUTION ORAL ONCE A DAY
Qty: 5 | Refills: 0 | Status: ACTIVE | COMMUNITY

## 2022-06-14 RX ORDER — PREDNISONE 10 MG/1
3 TABLET TABLET ORAL ONCE A DAY
Qty: 90 TABLET | Refills: 1 | Status: ACTIVE | COMMUNITY

## 2022-06-14 RX ORDER — USTEKINUMAB 90 MG/ML
AS DIRECTED INJECTION, SOLUTION SUBCUTANEOUS
Qty: 1 PRE-FILLED PEN SYRINGE | Refills: 2 | Status: ACTIVE | COMMUNITY

## 2022-06-14 RX ORDER — CHOLESTYRAMINE LIGHT 4 G/5.7G
1 PACKET POWDER, FOR SUSPENSION ORAL
Qty: 30 | Refills: 5 | Status: ACTIVE | COMMUNITY

## 2022-06-14 RX ORDER — USTEKINUMAB 90 MG/ML
AS DIRECTED INJECTION, SOLUTION SUBCUTANEOUS
Qty: 1 | Refills: 11 | Status: ACTIVE | COMMUNITY

## 2022-06-14 RX ORDER — FOLIC ACID 1 MG/1
1 TABLET TABLET ORAL ONCE A DAY
Qty: 30 | Refills: 11 | Status: ACTIVE | COMMUNITY

## 2022-06-14 NOTE — HPI-TODAY'S VISIT:
6/14/2022: - The ileal surgical anastomosis contained a moderate stenosis measuring 2 cm (in length) x 9 mm (inner diameter) that  was non-traversed.  A TTS dilator was passed through the scope.  Dilation with an 8-9-10 mm colonic balloon dilator  was performed.  The dilation site was examined following endoscope reinsertion and showed moderate mucosal  disruption with tear and small amount of bleeding (expected) - The retroflexed view of the distal rectum and anal verge was normal and showed no anal or rectal abnormalities.

## 2022-07-19 ENCOUNTER — TELEPHONE ENCOUNTER (OUTPATIENT)
Dept: URBAN - METROPOLITAN AREA CLINIC 92 | Facility: CLINIC | Age: 42
End: 2022-07-19

## 2022-07-21 ENCOUNTER — WEB ENCOUNTER (OUTPATIENT)
Dept: URBAN - METROPOLITAN AREA CLINIC 96 | Facility: CLINIC | Age: 42
End: 2022-07-21

## 2022-07-21 RX ORDER — FOLIC ACID 1 MG/1
1 TABLET TABLET ORAL ONCE A DAY
Qty: 30 | Refills: 11
End: 2023-07-25

## 2022-07-21 RX ORDER — METHOTREXATE SODIUM 2.5 MG/1
10 TAB TABLET ORAL WEEKLY
Qty: 40 | Refills: 4

## 2022-07-26 ENCOUNTER — WEB ENCOUNTER (OUTPATIENT)
Dept: URBAN - METROPOLITAN AREA CLINIC 96 | Facility: CLINIC | Age: 42
End: 2022-07-26

## 2022-07-26 RX ORDER — FOLIC ACID 1 MG/1
1 TABLET TABLET ORAL ONCE A DAY
Qty: 30 | Refills: 11
End: 2023-07-21

## 2022-07-26 RX ORDER — METHOTREXATE SODIUM 2.5 MG/1
10 TAB TABLET ORAL WEEKLY
Qty: 40 | Refills: 4

## 2022-08-03 ENCOUNTER — LAB OUTSIDE AN ENCOUNTER (OUTPATIENT)
Dept: URBAN - METROPOLITAN AREA CLINIC 96 | Facility: CLINIC | Age: 42
End: 2022-08-03

## 2022-08-04 LAB
A/G RATIO: 1.7
ALBUMIN: 4
ALKALINE PHOSPHATASE: 230
ALT (SGPT): 88
AST (SGOT): 101
BASO (ABSOLUTE): 0
BASOS: 1
BILIRUBIN, TOTAL: 0.5
BUN/CREATININE RATIO: 7
BUN: 7
CALCIUM: 9.2
CARBON DIOXIDE, TOTAL: 25
CHLORIDE: 98
CREATININE: 0.95
EGFR: 77
EOS (ABSOLUTE): 0.2
EOS: 4
GLOBULIN, TOTAL: 2.4
GLUCOSE: 107
HEMATOCRIT: 36.8
HEMATOLOGY COMMENTS:: (no result)
HEMOGLOBIN: 11.6
IMMATURE CELLS: (no result)
IMMATURE GRANS (ABS): 0
IMMATURE GRANULOCYTES: 0
LYMPHS (ABSOLUTE): 1.6
LYMPHS: 25
MCH: 27.8
MCHC: 31.5
MCV: 88
MONOCYTES(ABSOLUTE): 0.6
MONOCYTES: 9
NEUTROPHILS (ABSOLUTE): 4.1
NEUTROPHILS: 61
NRBC: (no result)
PLATELETS: 334
POTASSIUM: 3.5
PROTEIN, TOTAL: 6.4
RBC: 4.17
RDW: 16
SODIUM: 137
WBC: 6.6

## 2022-08-12 ENCOUNTER — TELEPHONE ENCOUNTER (OUTPATIENT)
Dept: URBAN - METROPOLITAN AREA CLINIC 92 | Facility: CLINIC | Age: 42
End: 2022-08-12

## 2022-08-12 PROBLEM — 71833008: Status: ACTIVE | Noted: 2021-10-04

## 2022-09-30 LAB
A/G RATIO: 1.5
ALBUMIN: 4.3
ALKALINE PHOSPHATASE: 165
ALT (SGPT): 57
AST (SGOT): 30
BILIRUBIN, TOTAL: 0.4
BUN/CREATININE RATIO: 9
BUN: 9
CALCIUM: 9.5
CARBON DIOXIDE, TOTAL: 19
CHLORIDE: 102
CREATININE: 0.99
EGFR: 73
GLOBULIN, TOTAL: 2.8
GLUCOSE: 105
POTASSIUM: 4.3
PROTEIN, TOTAL: 7.1
SODIUM: 140

## 2022-12-01 ENCOUNTER — TELEPHONE ENCOUNTER (OUTPATIENT)
Dept: URBAN - METROPOLITAN AREA CLINIC 96 | Facility: CLINIC | Age: 42
End: 2022-12-01

## 2023-01-12 ENCOUNTER — OFFICE VISIT (OUTPATIENT)
Dept: URBAN - METROPOLITAN AREA TELEHEALTH 2 | Facility: TELEHEALTH | Age: 43
End: 2023-01-12

## 2023-01-12 ENCOUNTER — DASHBOARD ENCOUNTERS (OUTPATIENT)
Age: 43
End: 2023-01-12

## 2023-01-12 RX ORDER — PREDNISONE 10 MG/1
3 TABLET TABLET ORAL ONCE A DAY
Qty: 90 TABLET | Refills: 1 | Status: ACTIVE | COMMUNITY

## 2023-01-12 RX ORDER — POTASSIUM CHLORIDE 1.5 G/1.58G
1 PACKET WITH FOOD POWDER, FOR SOLUTION ORAL ONCE A DAY
Qty: 5 | Refills: 0 | Status: ACTIVE | COMMUNITY

## 2023-01-12 RX ORDER — METHOTREXATE SODIUM 2.5 MG/1
10 TAB TABLET ORAL WEEKLY
Qty: 40 | Refills: 4 | Status: ACTIVE | COMMUNITY

## 2023-01-12 RX ORDER — USTEKINUMAB 90 MG/ML
AS DIRECTED INJECTION, SOLUTION SUBCUTANEOUS
Qty: 1 | Refills: 11 | Status: ACTIVE | COMMUNITY

## 2023-01-12 RX ORDER — USTEKINUMAB 90 MG/ML
AS DIRECTED INJECTION, SOLUTION SUBCUTANEOUS
Qty: 1 PRE-FILLED PEN SYRINGE | Refills: 2 | Status: ACTIVE | COMMUNITY

## 2023-01-12 RX ORDER — CHOLESTYRAMINE LIGHT 4 G/5.7G
1 PACKET POWDER, FOR SUSPENSION ORAL
Qty: 30 | Refills: 5 | Status: ACTIVE | COMMUNITY

## 2023-01-12 RX ORDER — FOLIC ACID 1 MG/1
1 TABLET TABLET ORAL ONCE A DAY
Qty: 30 | Refills: 11 | Status: ACTIVE | COMMUNITY
End: 2023-07-21

## 2023-03-13 ENCOUNTER — WEB ENCOUNTER (OUTPATIENT)
Dept: URBAN - METROPOLITAN AREA CLINIC 96 | Facility: CLINIC | Age: 43
End: 2023-03-13

## 2023-05-14 LAB
A/G RATIO: 1.8
ALBUMIN: 4.3
ALKALINE PHOSPHATASE: 164
ALT (SGPT): 51
AST (SGOT): 31
BASO (ABSOLUTE): 0
BASOS: 0
BILIRUBIN, TOTAL: 0.2
BUN/CREATININE RATIO: 11
BUN: 10
C-REACTIVE PROTEIN, QUANT: 3
CALCIUM: 8.9
CARBON DIOXIDE, TOTAL: 22
CHLORIDE: 103
CREATININE: 0.87
EGFR: 86
EOS (ABSOLUTE): 0.2
EOS: 1
GLOBULIN, TOTAL: 2.4
GLUCOSE: 102
HEMATOCRIT: 38.3
HEMATOLOGY COMMENTS:: (no result)
HEMOGLOBIN: 12.2
IMMATURE CELLS: (no result)
IMMATURE GRANS (ABS): 0
IMMATURE GRANULOCYTES: 0
LYMPHS (ABSOLUTE): 2
LYMPHS: 16
MCH: 28.4
MCHC: 31.9
MCV: 89
MONOCYTES(ABSOLUTE): 0.5
MONOCYTES: 4
NEUTROPHILS (ABSOLUTE): 9.9
NEUTROPHILS: 79
NRBC: (no result)
PLATELETS: 216
POTASSIUM: 4.7
PROTEIN, TOTAL: 6.7
QUANTIFERON CRITERIA: (no result)
QUANTIFERON INCUBATION: (no result)
QUANTIFERON MITOGEN VALUE: >10
QUANTIFERON NIL VALUE: 0
QUANTIFERON TB1 AG VALUE: 0
QUANTIFERON TB2 AG VALUE: 0.01
QUANTIFERON-TB GOLD PLUS: NEGATIVE
RBC: 4.3
RDW: 14.2
SODIUM: 139
VITAMIN B12: 469
VITAMIN D, 25-HYDROXY: 31.8
WBC: 12.6

## 2023-09-10 LAB
A/G RATIO: 1.6
ALBUMIN: 4.6
ALKALINE PHOSPHATASE: 253
ALT (SGPT): 78
AST (SGOT): 34
BILIRUBIN, TOTAL: 0.4
BUN/CREATININE RATIO: 9
BUN: 10
CALCIUM: 9.7
CARBON DIOXIDE, TOTAL: 22
CHLORIDE: 103
CREATININE: 1.08
EGFR: 66
GLOBULIN, TOTAL: 2.9
GLUCOSE: 103
POTASSIUM: 3.9
PROTEIN, TOTAL: 7.5
SODIUM: 142

## 2025-03-25 ENCOUNTER — WEB ENCOUNTER (OUTPATIENT)
Dept: URBAN - METROPOLITAN AREA CLINIC 96 | Facility: CLINIC | Age: 45
End: 2025-03-25

## 2025-04-02 ENCOUNTER — OFFICE VISIT (OUTPATIENT)
Dept: URBAN - METROPOLITAN AREA CLINIC 96 | Facility: CLINIC | Age: 45
End: 2025-04-02

## 2025-04-14 ENCOUNTER — WEB ENCOUNTER (OUTPATIENT)
Dept: URBAN - METROPOLITAN AREA CLINIC 96 | Facility: CLINIC | Age: 45
End: 2025-04-14